# Patient Record
Sex: MALE | Race: WHITE | NOT HISPANIC OR LATINO | URBAN - METROPOLITAN AREA
[De-identification: names, ages, dates, MRNs, and addresses within clinical notes are randomized per-mention and may not be internally consistent; named-entity substitution may affect disease eponyms.]

---

## 2023-05-23 ENCOUNTER — EMERGENCY (EMERGENCY)
Facility: HOSPITAL | Age: 53
LOS: 1 days | Discharge: HOME | End: 2023-05-23
Attending: EMERGENCY MEDICINE | Admitting: EMERGENCY MEDICINE
Payer: COMMERCIAL

## 2023-05-23 VITALS
SYSTOLIC BLOOD PRESSURE: 117 MMHG | OXYGEN SATURATION: 96 % | HEART RATE: 76 BPM | DIASTOLIC BLOOD PRESSURE: 81 MMHG | RESPIRATION RATE: 20 BRPM

## 2023-05-23 VITALS
HEIGHT: 70 IN | RESPIRATION RATE: 18 BRPM | OXYGEN SATURATION: 96 % | SYSTOLIC BLOOD PRESSURE: 121 MMHG | HEART RATE: 71 BPM | DIASTOLIC BLOOD PRESSURE: 88 MMHG | WEIGHT: 160.06 LBS | TEMPERATURE: 98 F

## 2023-05-23 DIAGNOSIS — R29.810 FACIAL WEAKNESS: ICD-10-CM

## 2023-05-23 LAB
ALBUMIN SERPL ELPH-MCNC: 4 G/DL — SIGNIFICANT CHANGE UP (ref 3.4–5)
ALP SERPL-CCNC: 59 U/L — SIGNIFICANT CHANGE UP (ref 40–120)
ALT FLD-CCNC: 51 U/L — HIGH (ref 12–42)
ANION GAP SERPL CALC-SCNC: 5 MMOL/L — LOW (ref 9–16)
AST SERPL-CCNC: 29 U/L — SIGNIFICANT CHANGE UP (ref 15–37)
BASOPHILS # BLD AUTO: 0.07 K/UL — SIGNIFICANT CHANGE UP (ref 0–0.2)
BASOPHILS NFR BLD AUTO: 1.2 % — SIGNIFICANT CHANGE UP (ref 0–2)
BILIRUB SERPL-MCNC: 0.4 MG/DL — SIGNIFICANT CHANGE UP (ref 0.2–1.2)
BUN SERPL-MCNC: 15 MG/DL — SIGNIFICANT CHANGE UP (ref 7–23)
CALCIUM SERPL-MCNC: 8.9 MG/DL — SIGNIFICANT CHANGE UP (ref 8.5–10.5)
CHLORIDE SERPL-SCNC: 105 MMOL/L — SIGNIFICANT CHANGE UP (ref 96–108)
CO2 SERPL-SCNC: 30 MMOL/L — SIGNIFICANT CHANGE UP (ref 22–31)
CREAT SERPL-MCNC: 0.98 MG/DL — SIGNIFICANT CHANGE UP (ref 0.5–1.3)
EGFR: 92 ML/MIN/1.73M2 — SIGNIFICANT CHANGE UP
EOSINOPHIL # BLD AUTO: 0.2 K/UL — SIGNIFICANT CHANGE UP (ref 0–0.5)
EOSINOPHIL NFR BLD AUTO: 3.6 % — SIGNIFICANT CHANGE UP (ref 0–6)
FLUAV AG NPH QL: SIGNIFICANT CHANGE UP
FLUBV AG NPH QL: SIGNIFICANT CHANGE UP
GLUCOSE SERPL-MCNC: 95 MG/DL — SIGNIFICANT CHANGE UP (ref 70–99)
HCT VFR BLD CALC: 42.5 % — SIGNIFICANT CHANGE UP (ref 39–50)
HGB BLD-MCNC: 14.8 G/DL — SIGNIFICANT CHANGE UP (ref 13–17)
IMM GRANULOCYTES NFR BLD AUTO: 0.2 % — SIGNIFICANT CHANGE UP (ref 0–0.9)
LIDOCAIN IGE QN: 170 U/L — SIGNIFICANT CHANGE UP (ref 73–393)
LYMPHOCYTES # BLD AUTO: 1.92 K/UL — SIGNIFICANT CHANGE UP (ref 1–3.3)
LYMPHOCYTES # BLD AUTO: 34.1 % — SIGNIFICANT CHANGE UP (ref 13–44)
MCHC RBC-ENTMCNC: 30 PG — SIGNIFICANT CHANGE UP (ref 27–34)
MCHC RBC-ENTMCNC: 34.8 GM/DL — SIGNIFICANT CHANGE UP (ref 32–36)
MCV RBC AUTO: 86.2 FL — SIGNIFICANT CHANGE UP (ref 80–100)
MONOCYTES # BLD AUTO: 0.47 K/UL — SIGNIFICANT CHANGE UP (ref 0–0.9)
MONOCYTES NFR BLD AUTO: 8.3 % — SIGNIFICANT CHANGE UP (ref 2–14)
NEUTROPHILS # BLD AUTO: 2.96 K/UL — SIGNIFICANT CHANGE UP (ref 1.8–7.4)
NEUTROPHILS NFR BLD AUTO: 52.6 % — SIGNIFICANT CHANGE UP (ref 43–77)
NRBC # BLD: 0 /100 WBCS — SIGNIFICANT CHANGE UP (ref 0–0)
NT-PROBNP SERPL-SCNC: 18 PG/ML — SIGNIFICANT CHANGE UP
PLATELET # BLD AUTO: 194 K/UL — SIGNIFICANT CHANGE UP (ref 150–400)
POTASSIUM SERPL-MCNC: 4 MMOL/L — SIGNIFICANT CHANGE UP (ref 3.5–5.3)
POTASSIUM SERPL-SCNC: 4 MMOL/L — SIGNIFICANT CHANGE UP (ref 3.5–5.3)
PROT SERPL-MCNC: 7.2 G/DL — SIGNIFICANT CHANGE UP (ref 6.4–8.2)
RBC # BLD: 4.93 M/UL — SIGNIFICANT CHANGE UP (ref 4.2–5.8)
RBC # FLD: 11.9 % — SIGNIFICANT CHANGE UP (ref 10.3–14.5)
RSV RNA NPH QL NAA+NON-PROBE: SIGNIFICANT CHANGE UP
SARS-COV-2 RNA SPEC QL NAA+PROBE: SIGNIFICANT CHANGE UP
SODIUM SERPL-SCNC: 140 MMOL/L — SIGNIFICANT CHANGE UP (ref 132–145)
TROPONIN I, HIGH SENSITIVITY RESULT: <4 NG/L — SIGNIFICANT CHANGE UP
WBC # BLD: 5.63 K/UL — SIGNIFICANT CHANGE UP (ref 3.8–10.5)
WBC # FLD AUTO: 5.63 K/UL — SIGNIFICANT CHANGE UP (ref 3.8–10.5)

## 2023-05-23 PROCEDURE — 99285 EMERGENCY DEPT VISIT HI MDM: CPT

## 2023-05-23 PROCEDURE — 70498 CT ANGIOGRAPHY NECK: CPT | Mod: 26

## 2023-05-23 PROCEDURE — 71045 X-RAY EXAM CHEST 1 VIEW: CPT | Mod: 26

## 2023-05-23 PROCEDURE — 0042T: CPT

## 2023-05-23 PROCEDURE — 70496 CT ANGIOGRAPHY HEAD: CPT | Mod: 26

## 2023-05-23 RX ORDER — VALACYCLOVIR 500 MG/1
1 TABLET, FILM COATED ORAL
Qty: 21 | Refills: 0
Start: 2023-05-23 | End: 2023-05-29

## 2023-05-23 RX ORDER — VALACYCLOVIR 500 MG/1
1000 TABLET, FILM COATED ORAL ONCE
Refills: 0 | Status: COMPLETED | OUTPATIENT
Start: 2023-05-23 | End: 2023-05-23

## 2023-05-23 RX ADMIN — Medication 50 MILLIGRAM(S): at 16:50

## 2023-05-23 RX ADMIN — VALACYCLOVIR 1000 MILLIGRAM(S): 500 TABLET, FILM COATED ORAL at 16:49

## 2023-05-23 NOTE — ED PROVIDER NOTE - PROVIDER TOKENS
PROVIDER:[TOKEN:[[id number]]],PROVIDER:[TOKEN:[[id number]]],PROVIDER:[TOKEN:[[id number]]]

## 2023-05-23 NOTE — ED PROVIDER NOTE - OBJECTIVE STATEMENT
53yo M hx of HLD, family hx of CVA in father, presents with L facial droop x1 day, which pt initially noted this morning after waking up.  States it has become progressively worse throughout the day.  Pt reports getting shingles vaccine 6d ago, then developed "flu like symptoms" with diffuse myalgias and subjective fever and chills over the next 2 days.  Then, 3d ago, pt had episode of superior visual field cut in L eye only lasting a few minutes, which resolved on its own.  Pt reports he then developed L sided neck pain and achiness.  Today, pt woke up with L ear pain and L facial droop.  Went to  and was sent to ED for eval for bells palsy vs CVA.  No unilateral weakness or numbness.  No slurred speech.  No changes in vision currently aside from episode 3d ago.

## 2023-05-23 NOTE — ED ADULT TRIAGE NOTE - CHIEF COMPLAINT QUOTE
Pt sent from [facility] for Bell's Palsy vs. neuro problem work up. Pt received 2nd dose of Shingle's Vaccine Wednesday (5/17) and experienced L sided neck tightness that night. 3 days ago (Saturday), pt experienced temporary L sided vision loss that lasted approx 2 minutes then resolved. Pt went to [facility] today c/o L ear pain, they dx him with infection. Pt notably not blinking often in L eye, L side of face asymmetrical. PMH of HDL, takes atorvastatin.

## 2023-05-23 NOTE — ED ADULT NURSE NOTE - CHIEF COMPLAINT QUOTE
Pt sent from [facility] for Bell's Palsy vs. neuro problem work up. Pt received 2nd dose of Shingle's Vaccine Wednesday (5/17) and experienced L sided neck tightness that night. 3 days ago (Saturday), pt experienced temporary L sided vision loss that lasted approx 2 minutes then resolved. Pt went to [facility] today c/o L ear pain, they dx him with infection. Pt notably not blinking often in L eye, L side of face asymmetrical. PMH of HDL, takes atorvastatin.

## 2023-05-23 NOTE — ED PROVIDER NOTE - CARE PROVIDER_API CALL
Olimpia Terry; MPH)  Surgery; Thoracic Surgery  [address] [address]  Phone: [phone]  Fax: [phone]  Follow Up Time:     Dawson Brito)  Surgery; Vascular Surgery  [address], [address] [place] [address]  Phone: [phone]  Fax: [phone]  Follow Up Time:     Yesica Jorgensen)  Neurology  [address] [place] [address]  Phone: [phone]  Fax: [phone]  Follow Up Time:

## 2023-05-23 NOTE — ED PROVIDER NOTE - NIH STROKE SCALE: 2. BEST GAZE, QM
(0) Normal
Assistance with ambulation/Assistance OOB with selected safe patient handling equipment/Communicate Risk of Fall with Harm to all staff/Monitor for mental status changes/Monitor gait and stability/Reinforce activity limits and safety measures with patient and family/Tailored Fall Risk Interventions/Toileting schedule using arm’s reach rule for commode and bathroom/Use of alarms - bed, chair and/or voice tab/Visual Cue: Yellow wristband and red socks/Bed in lowest position, wheels locked, appropriate side rails in place/Call bell, personal items and telephone in reach/Instruct patient to call for assistance before getting out of bed or chair/Non-slip footwear when patient is out of bed/[place] to call system/Physically safe environment - no spills, clutter or unnecessary equipment/Purposeful Proactive Rounding/Room/bathroom lighting operational, light cord in reach

## 2023-05-23 NOTE — ED PROVIDER NOTE - PATIENT PORTAL LINK FT
You can access the FollowMyHealth Patient Portal offered by [facility] by registering at the following website: [email or web address]. By joining [employer]’s FollowMyHealth portal, you will also be able to view your health information using other applications (apps) compatible with our system.

## 2023-05-23 NOTE — ED PROVIDER NOTE - CARE PROVIDERS DIRECT ADDRESSES
,DirectAddress_Unknown,[email or web address],DirectAddress_Unknown

## 2023-05-23 NOTE — ED ADULT NURSE NOTE - OBJECTIVE STATEMENT
wedensday received 2nd dose of shingles vaccine, thursday developed flu like symptoms, saturday developed decreased peripheral vision to left eye with with left facial numbness-resolved, this am developed left side facial droop this am

## 2023-05-23 NOTE — ED PROVIDER NOTE - CLINICAL SUMMARY MEDICAL DECISION MAKING FREE TEXT BOX
53yo M hx of HLD presents with 1d of L facial droop and L ear pain following transient episode of L eye visual field cut 3d ago and flu like symptoms for 2 days prior to that after getting shingles vaccine.  On exam afebrile, VSS, well appearing, with L facial droop at rest but preserved function of facial muscles on testing.  L TM erythema compared to R.  Ddx includes [place] vs. CVA.  Stoke code called upon my eval of pt.  Plan for CT head, perfusion study, CTA head/ neck, telestroke consult.

## 2023-05-23 NOTE — ED PROVIDER NOTE - NSFOLLOWUPINSTRUCTIONS_ED_ALL_ED_FT
You have an incidental finding of an ascending aortic aneurysm of 4.4cm.  Follow up with thoracic surgery for further monitoring of this.    Follow up with neurology for further evaluation of your facial droop and other symptoms.          Bell's Palsy, Adult    Bell's palsy is a short-term inability to move muscles in a part of the face. The inability to move, also called paralysis, results from inflammation or compression of the seventh cranial nerve. This nerve travels along the skull and under the ear to the side of the face. This nerve is responsible for facial movements that include blinking, closing the eyes, smiling, and frowning.    What are the causes?  The exact cause of this condition is not known. It may be caused by an infection from a virus, such as the chickenpox (herpes zoster), Mei–Barr, or mumps virus.    What increases the risk?  You are more likely to develop this condition if:  You are pregnant.  You have diabetes.  You have had a recent infection in your nose, throat, or airways.  You have a weakened body defense system (immune system).  You have had a facial injury, such as a fracture.  You have a family history of Bell's palsy.  What are the signs or symptoms?  Symptoms of this condition include:  Weakness on one side of the face.  Drooping eyelid and corner of the mouth.  Excessive tearing in one eye.  Difficulty closing the eyelid.  Dry eye.  Drooling.  Dry mouth.  Changes in taste.  Change in facial appearance.  Pain behind one ear.  Ringing in one or both ears.  Sensitivity to sound in one ear.  Facial twitching.  Headache.  Impaired speech.  Dizziness.  Difficulty eating or drinking.  Most of the time, only one side of the face is affected. In rare cases, Bell's palsy may affect the whole face.    How is this diagnosed?  This condition is diagnosed based on:  Your symptoms.  Your medical history.  A physical exam.  You may also have to see health care providers who specialize in disorders of the nerves (neurologist) or diseases and conditions of the eye (ophthalmologist). You may have tests, such as:  A test to check for nerve damage (electromyogram).  Imaging studies, such as a CT scan or an MRI.  Blood tests.  How is this treated?  This condition affects every person differently. Sometimes symptoms go away without treatment within a couple weeks. If treatment is needed, it varies from person to person. The goal of treatment is to reduce inflammation and protect the eye from damage. Treatment for Bell's palsy may include:  Medicines, such as:  Steroids to reduce swelling and inflammation.  Antiviral medicines.  Pain relievers, including aspirin, acetaminophen, or ibuprofen.  Eye drops or ointment to keep your eye moist.  Eye protection, if you cannot close your eye.  Exercises or massage to regain muscle strength and function (physical therapy).  Follow these instructions at home:    Take over-the-counter and prescription medicines only as told by your health care provider.  If your eye is affected:  Keep your eye moist with eye drops or ointment as told by your health care provider.  Follow instructions for eye care and protection as told by your health care provider.  Do any physical therapy exercises as told by your health care provider.  Keep all follow-up visits. This is important.  Contact a health care provider if:  You have a fever or chills.  Your symptoms do not get better within 2–3 weeks, or your symptoms get worse.  Your eye is red, irritated, or painful.  You have new symptoms.  Get help right away if:  You have weakness or numbness in a part of your body other than your face.  You have trouble swallowing.  You develop neck pain or stiffness.  You develop dizziness or shortness of breath.  Summary  Bell's palsy is a short-term inability to move muscles in a part of the face. The inability to move results from inflammation or compression of the facial nerve.  This condition affects every person differently. Sometimes symptoms go away without treatment within a couple weeks.  If treatment is needed, it varies from person to person. The goal of treatment is to reduce inflammation and protect the eye from damage.  Contact your health care provider if your symptoms do not get better within 2–3 weeks, or your symptoms get worse.  This information is not intended to replace advice given to you by your health care provider. Make sure you discuss any questions you have with your health care provider.

## 2023-05-23 NOTE — ED PROVIDER NOTE - PROGRESS NOTE DETAILS
CT head neg.  Discussed w Dr. Jorgensen from Telestroke.  Likely [place].  If imaging negative, OK to dc awilda Helms treatment. Imaging negative for bleed, dissection, or LVO.  Will treat for [place] Palsy.    Given erythema of L TM without ulcerated lesions, will give PO abx as well to cover for bacterial otitis media.

## 2023-05-23 NOTE — ED ADULT TRIAGE NOTE - BEFAST BALANCE

## 2023-05-23 NOTE — ED PROVIDER NOTE - PHYSICAL EXAMINATION
Constitutional: awake and alert, in no acute distress  HEENT: head normocephalic and atraumatic. moist mucous membranes.  L TM erythematous.  R TM nml.  Eyes: blinking more frequently with R eye than L eye.    Neck: supple, normal ROM  Cardiovascular: regular rate   Pulmonary: no respiratory distress  Gastrointestinal: abdomen flat and nondistended  Skin: warm, dry, normal for ethnicity  Musculoskeletal: no edema, no deformity  Neurological: oriented x4.  Flattening of L nasolabial fold.  Mild L facial droop at rest (involving forehead), good strength b/l on testing.  Psychiatric: calm and cooperative

## 2023-05-23 NOTE — ED PROVIDER NOTE - WR INTERPRETATION DATE TIME  1
Clinic hours for Dr. Santoyo:  Monday    In Surgery  Tuesday 7:30am - 4:30pm  Wednesday 7:30am - 4:30pm  Thursday       7:30am - 4:30pm  Friday            7:30 - 11am    If you need a refill on your prescription please call your pharmacy and let them know. Please be proactive and call before your medication runs out.    The pharmacy will then contact us for the refill.  Please allow 24-48 hours for the refill to be processed.     If your physician has ordered additional laboratory or radiology testing as part of your ongoing plan of care, please allow 5-7 business days from the day of your lab draw or test for the results to be sent and reviewed by your provider. If your results are critical and require more immediate intervention, you will be contacted sooner. Your results will be conveyed to you via a phone call or letter.    You may be receiving a patient satisfaction survey in the mail.   Please take the time to complete, as your feedback is very important to us.   We strive to make your experience exceptional and your comments help us with that goal.  We look forward to hearing from you.      
23-May-2023 16:27

## 2023-05-23 NOTE — ED ADULT NURSE NOTE - NSFALLUNIVINTERV_ED_ALL_ED
Bed/Stretcher in lowest position, wheels locked, appropriate side rails in place/Call bell, personal items and telephone in reach/Instruct patient to call for assistance before getting out of bed/chair/stretcher/Non-slip footwear applied when patient is off stretcher/[place] to call system/Physically safe environment - no spills, clutter or unnecessary equipment/Purposeful proactive rounding/Room/bathroom lighting operational, light cord in reach

## 2023-05-26 DIAGNOSIS — R50.9 FEVER, UNSPECIFIED: ICD-10-CM

## 2023-05-26 DIAGNOSIS — M54.2 CERVICALGIA: ICD-10-CM

## 2023-05-26 DIAGNOSIS — T50.Z95A ADVERSE EFFECT OF OTHER VACCINES AND BIOLOGICAL SUBSTANCES, INITIAL ENCOUNTER: ICD-10-CM

## 2023-05-26 DIAGNOSIS — G51.0 BELL'S PALSY: ICD-10-CM

## 2023-05-26 DIAGNOSIS — H92.02 OTALGIA, LEFT EAR: ICD-10-CM

## 2023-05-26 DIAGNOSIS — E78.5 HYPERLIPIDEMIA, UNSPECIFIED: ICD-10-CM

## 2023-05-26 DIAGNOSIS — M79.10 MYALGIA, UNSPECIFIED SITE: ICD-10-CM

## 2023-05-26 DIAGNOSIS — Z82.3 FAMILY HISTORY OF STROKE: ICD-10-CM

## 2023-05-26 DIAGNOSIS — T80.62XA OTHER SERUM REACTION DUE TO VACCINATION, INITIAL ENCOUNTER: ICD-10-CM

## 2023-05-31 ENCOUNTER — TRANSCRIPTION ENCOUNTER (OUTPATIENT)
Age: 53
End: 2023-05-31

## 2023-06-01 ENCOUNTER — APPOINTMENT (OUTPATIENT)
Dept: MRI IMAGING | Facility: CLINIC | Age: 53
End: 2023-06-01